# Patient Record
Sex: MALE | Race: BLACK OR AFRICAN AMERICAN
[De-identification: names, ages, dates, MRNs, and addresses within clinical notes are randomized per-mention and may not be internally consistent; named-entity substitution may affect disease eponyms.]

---

## 2019-08-15 ENCOUNTER — HOSPITAL ENCOUNTER (INPATIENT)
Dept: HOSPITAL 1 - ED | Age: 59
LOS: 7 days | Discharge: HOME | DRG: 41 | End: 2019-08-22
Attending: INTERNAL MEDICINE | Admitting: INTERNAL MEDICINE
Payer: COMMERCIAL

## 2019-08-15 VITALS
WEIGHT: 203 LBS | BODY MASS INDEX: 27.5 KG/M2 | HEIGHT: 72 IN | WEIGHT: 203 LBS | HEIGHT: 72 IN | BODY MASS INDEX: 27.5 KG/M2

## 2019-08-15 VITALS — DIASTOLIC BLOOD PRESSURE: 83 MMHG | SYSTOLIC BLOOD PRESSURE: 139 MMHG

## 2019-08-15 VITALS — DIASTOLIC BLOOD PRESSURE: 79 MMHG | SYSTOLIC BLOOD PRESSURE: 124 MMHG

## 2019-08-15 DIAGNOSIS — E11.42: Primary | ICD-10-CM

## 2019-08-15 DIAGNOSIS — Z79.84: ICD-10-CM

## 2019-08-15 DIAGNOSIS — Z22.322: ICD-10-CM

## 2019-08-15 DIAGNOSIS — M86.9: ICD-10-CM

## 2019-08-15 DIAGNOSIS — E11.69: ICD-10-CM

## 2019-08-15 DIAGNOSIS — I10: ICD-10-CM

## 2019-08-15 DIAGNOSIS — E11.621: ICD-10-CM

## 2019-08-15 DIAGNOSIS — Z89.422: ICD-10-CM

## 2019-08-15 DIAGNOSIS — M1A.9XX1: ICD-10-CM

## 2019-08-15 DIAGNOSIS — L97.511: ICD-10-CM

## 2019-08-15 DIAGNOSIS — M06.9: ICD-10-CM

## 2019-08-15 DIAGNOSIS — Z89.421: ICD-10-CM

## 2019-08-15 DIAGNOSIS — E83.42: ICD-10-CM

## 2019-08-15 DIAGNOSIS — E11.65: ICD-10-CM

## 2019-08-15 LAB
ALBUMIN SERPL-MCNC: 3.5 G/DL (ref 3.4–5)
ALP SERPL-CCNC: 99 U/L (ref 46–116)
ALT SERPL-CCNC: 37 U/L (ref 16–63)
AST SERPL-CCNC: 20 U/L (ref 15–37)
BASOPHILS NFR BLD: 0.4 % (ref 0–2)
BILIRUB SERPL-MCNC: 0.7 MG/DL (ref 0.2–1)
BUN SERPL-MCNC: 30 MG/DL (ref 7–18)
CALCIUM SERPL-MCNC: 9.5 MG/DL (ref 8.5–10.1)
CHLORIDE SERPL-SCNC: 100 MMOL/L (ref 98–107)
CHOLEST SERPL-MCNC: 127 MG/DL (ref ?–200)
CHOLEST/HDLC SERPL: 2.3 MG/DL
CO2 SERPL-SCNC: 18.3 MMOL/L (ref 21–32)
CREAT SERPL-MCNC: 1.7 MG/DL (ref 0.7–1.3)
ERYTHROCYTE [DISTWIDTH] IN BLOOD BY AUTOMATED COUNT: 14.5 % (ref 11.5–14.5)
GFR SERPLBLD BASED ON 1.73 SQ M-ARVRAT: 44 ML/MIN
GLUCOSE SERPL-MCNC: 170 MG/DL (ref 74–106)
HDLC SERPL-MCNC: 56 MG/DL (ref 40–60)
MICROSCOPIC UR-IMP: YES
PLATELET # BLD: 211 X10^3MCL (ref 130–400)
POTASSIUM SERPL-SCNC: 4.7 MMOL/L (ref 3.5–5.1)
PROT SERPL-MCNC: 8 G/DL (ref 6.4–8.2)
RBC # UR STRIP.AUTO: (no result) /UL
SODIUM SERPL-SCNC: 134 MMOL/L (ref 136–145)
TRIGL SERPL-MCNC: 147 MG/DL (ref ?–150)
UA SPECIFIC GRAVITY: 1.02 (ref 1–1.03)

## 2019-08-15 PROCEDURE — G0378 HOSPITAL OBSERVATION PER HR: HCPCS

## 2019-08-15 NOTE — NUR
PT MEDICATED PER MD ORDER, PT VERBALIZED UNDERSTANDING OF MEDICATION PRIOR TO
ADMINSITRATION. REPORTS THAT HIS WIFE CAN COME AT PICK HIM UP IF NEEDED. DR NUÑEZ AT BEDSIDE TO SPEAK WITH PT REGARDING PLAN OF CARE.

## 2019-08-15 NOTE — NUR
PT RESTING IN BED. NO ACUTE DISTRESS. AAOX4. BREATHING EVEN AND UNLABORED ON
RA. STATES BILAT 3RD TOE PAIN TOLERABLE AT THIS TIME. IVF INFUSING, NO REDNESS
OR SWELLING TO IV SITE. HOB ELEVATED. VISITOR AT BEDSIDE. BED IN LOW POSITON,
CALL LIGHT WITHIN REACH. WILL ENDORSE TO ONCOMING SHIFT.

## 2019-08-15 NOTE — NUR
RECEIVED PT IN BED, A/O X4. DENIES HEADACHE/DIZZINESS. RESP. EVEN AND
UNLABORED.ON ROOM AIR, LUNG SOUNDS CLEAR BILAT. NO ACUTE DISTRESS
NOTED.AFEBRILE AND VITAL SIGNS STABLE. DENIES CP OR ANY DISCOMFORT AT THIS
TIME. SWELLING TO RT, LT 3RD TOE ELEVATED ON PILLOW, WOUND CULTURE OBTAINED
AND SEND TO LAB AS ORDERED.IVF, NS AT 100ML/HR, INTACT AND INFUISNG VIA LT
HAND. SITE CLEAR. ASSISTED WITH HS CARE. CALL LIGHT WITHIN REACH. WILL
CONTINUE TO MONITOR.

## 2019-08-15 NOTE — NUR
PT SITTING COMFORTABLY IN GURNEY, AWAKE AND ALERT, RESP E/U, REPORTS PAIN IS
STILL 7/10 AT THIS TIME. MD MADE AWARE.

## 2019-08-15 NOTE — NUR
PT MEDICATED WITH SLOW IVP FENTANYL AND ABX INITIATED PER ORDER. PT VERBALIZED
OF MEDICATION PRIOR TO ADMINISTRATION.

## 2019-08-15 NOTE — NUR
PT BROUGHT IN BY SELF WITH C/O BILATERAL TOE PAIN. NO RECENT HX OF TRAUMA.
AT BEDSIDE PT IS AAOX4. RESPS E/U. SKIN IS PINK, WARM AND DRY. PERRLA.
PT PLACED ON MONITOR. BED RAILS UP X1 FOR SAFETY. PT ORIENTED TO ROOM, USE OF
CALL BELL AND BED IN LOWEST POSITION. PT IS CALM AND COOPERATIVE.
PT AMBULATED FROM LOBBY TO ED WITH STEADY GAIT. PT AWAITING MSE.

## 2019-08-15 NOTE — NUR
REPORT RECEIVE FROM [place]. PT SITTING ON SIDE OF GURNEY IN POSITION OF
COMFORT. AWAKE AND ALERT, RESP E/U, REPORTS TOE PAIN IS 7/10. MD MADE AWARE.

## 2019-08-16 VITALS — SYSTOLIC BLOOD PRESSURE: 113 MMHG | DIASTOLIC BLOOD PRESSURE: 68 MMHG

## 2019-08-16 VITALS — DIASTOLIC BLOOD PRESSURE: 78 MMHG | SYSTOLIC BLOOD PRESSURE: 126 MMHG

## 2019-08-16 VITALS — SYSTOLIC BLOOD PRESSURE: 123 MMHG | DIASTOLIC BLOOD PRESSURE: 74 MMHG

## 2019-08-16 VITALS — SYSTOLIC BLOOD PRESSURE: 103 MMHG | DIASTOLIC BLOOD PRESSURE: 61 MMHG

## 2019-08-16 LAB
BASOPHILS NFR BLD: 0.1 % (ref 0–2)
BUN SERPL-MCNC: 25 MG/DL (ref 7–18)
CALCIUM SERPL-MCNC: 8.5 MG/DL (ref 8.5–10.1)
CHLORIDE SERPL-SCNC: 104 MMOL/L (ref 98–107)
CO2 SERPL-SCNC: 18.3 MMOL/L (ref 21–32)
CREAT SERPL-MCNC: 1.7 MG/DL (ref 0.7–1.3)
ERYTHROCYTE [DISTWIDTH] IN BLOOD BY AUTOMATED COUNT: 14.7 % (ref 11.5–14.5)
GFR SERPLBLD BASED ON 1.73 SQ M-ARVRAT: 44 ML/MIN
GLUCOSE SERPL-MCNC: 113 MG/DL (ref 74–106)
MAGNESIUM SERPL-MCNC: 1.5 MG/DL (ref 1.8–2.4)
PLATELET # BLD: 187 X10^3MCL (ref 130–400)
POTASSIUM SERPL-SCNC: 4.7 MMOL/L (ref 3.5–5.1)
SODIUM SERPL-SCNC: 136 MMOL/L (ref 136–145)

## 2019-08-16 NOTE — NUR
INCISION AND DRAINAGE OF RT FOOT ABSCESS DONE AT THE BEDSIDE. AFEBRILE AND
VITAL SIGNS STABLE. KEPT COMFORTABLE. IVF AINTACT AND INFUSING WELL. VOIDING
FREELY. WILL ENDORSE TO INCOMING NURSE.

## 2019-08-16 NOTE — NUR
PT REPORTED ITCHING AFTER NIGHT RN GAVE NORCO FOR PAIN, REPORTED ITCHING
HAPPENED SEVERAL TIMES IN THE PAST WHEN NORCO GIVEN FOR PAIN, NO RESP DISTRESS
REPORTED, SKIN ASSESSMENT DONE, NO ZEENAT NOTED, NP REANNA MIRANDA MADE AWARE, NEW
ORDER NOTED, PT MADE AWARE, CHARGE NURSE DEVORAH MADE AWARE

## 2019-08-16 NOTE — NUR
PT RESTING IN BED, IN NO ACUTE RESP DISTRESS, AM MED GIVEN PER MD ORDER VIA
EMAR, TAKEN WELL, NO SIDE EFFECT NOTED AT THIS TIME, ALL NEEDS ADDRESSED
SAFETY PROTOCOL FOLLOWED, CHEKO TO MONITOR

## 2019-08-16 NOTE — NUR
PT IN BED AAO X4 VERBAL INTACT DRESSING TO RT 3RD TOE AMPUTATION, NO SIGNS OF
BLEEDING, SOME TOLERABLE PAIN, OFFERED PAIN MEDS PT REFUSED HE WILL CALL IF HE
NEEDED PAIN MEDS, REMINDED WT BEARING STATUS TO RT FOOT, IVF INFUSING WELL AS
ORDERED, SHIFT ASSESSMENT DONE, ATTENDED NEEDS CALL LIGHT AT REACH CONT TO
MONITOR.

## 2019-08-16 NOTE — NUR
NO COMPLAINTS NOTED AT THIS TIME. EYES CLOSED, APPEARS ASLEEP, EASILY
AROUSABLE . RESP. EVEN AND UNLABORED. NO ACUTE DISTRESS NOTED. CALL LIGHT
WITHIN REACH. WILL CONTINUE TO MONITOR.

## 2019-08-16 NOTE — NUR
PT RESTING IN BED, VERBAL, IN NO ACUTE DISTRESS, AXOX4, RESP EVEN, NO
COUGH/SOB, DENIED CP/PRESSURE/HA, DENIED N/V/D, ABD SOFT AND NON-TENDER,
AMBULAOTRY W/ ASSIST, DRESSING CDI, PALP PULSES, CAP REILF < 2 SECS,
CONTINENT, IV PATENT AND INFUSING WELL, ALL NEEDS ADDRESSED AT THIS TIME,
SAFETY PROTOCOL FOLLOWED, WILL ENDORSE TO ONCOMING RN

## 2019-08-16 NOTE — NUR
PT IN BED, IN NO ACUTE RESP DISTRESS, VERBAL, CALM AND COPPERATIVE, PERLLA, NO
REDNESS/DRAINAGE, RESP EVEN AND NON-LABORED, CHEST RISE SYMMETRICALLY,
MEDSURG, SEE SKIN ASSESSMENT, LUNGS CTA, RA, BS ACTIVE X 4, ABD SOFT AND
NON-TENDER TO TOUCH, CONTINENT, PALP PULSES, EDEMA TO (R) AND (L) 3RD TOES
POST-OP, DRESSING CDI, GEN. WEAKNESS, PT, IV PATENT AND INFUSIGN WELL, DENIED
PAIN/PRESSURE/CP, DENIED N/V/D, ALL NEEDS ADDRESSED AT THIS TIME, SAFETY
PROTOCOL FOLLOWED, CONTINUE TO MONITOR

## 2019-08-17 VITALS — SYSTOLIC BLOOD PRESSURE: 139 MMHG | DIASTOLIC BLOOD PRESSURE: 84 MMHG

## 2019-08-17 VITALS — DIASTOLIC BLOOD PRESSURE: 64 MMHG | SYSTOLIC BLOOD PRESSURE: 136 MMHG

## 2019-08-17 VITALS — DIASTOLIC BLOOD PRESSURE: 72 MMHG | SYSTOLIC BLOOD PRESSURE: 122 MMHG

## 2019-08-17 VITALS — SYSTOLIC BLOOD PRESSURE: 118 MMHG | DIASTOLIC BLOOD PRESSURE: 74 MMHG

## 2019-08-17 LAB
BASOPHILS NFR BLD: 0.6 % (ref 0–2)
BUN SERPL-MCNC: 25 MG/DL (ref 7–18)
CALCIUM SERPL-MCNC: 8.8 MG/DL (ref 8.5–10.1)
CHLORIDE SERPL-SCNC: 107 MMOL/L (ref 98–107)
CO2 SERPL-SCNC: 19.6 MMOL/L (ref 21–32)
CREAT SERPL-MCNC: 1.8 MG/DL (ref 0.7–1.3)
ERYTHROCYTE [DISTWIDTH] IN BLOOD BY AUTOMATED COUNT: 14.9 % (ref 11.5–14.5)
GFR SERPLBLD BASED ON 1.73 SQ M-ARVRAT: 41 ML/MIN
GLUCOSE SERPL-MCNC: 163 MG/DL (ref 74–106)
MAGNESIUM SERPL-MCNC: 2 MG/DL (ref 1.8–2.4)
PLATELET # BLD: 198 X10^3MCL (ref 130–400)
POTASSIUM SERPL-SCNC: 4.6 MMOL/L (ref 3.5–5.1)
SODIUM SERPL-SCNC: 138 MMOL/L (ref 136–145)

## 2019-08-17 NOTE — NUR
AAO TIMES 4. MED SURG PATIENT. LUNGS CTA. NO SOB. O2 SAT ON RA 96%. BS'S
ACTIVE TIMES 4. HAMILTON STRONG, EXCEPT HE IS AWARE THAT HE IS NON WEIGHT BEARING
ON RIGHT FOOT. RIGHT FOOT DRESSING CDI. COOPERATIVE. AND PLEASANT. PULSES
PALPABLE. NO C/O PAIN.

## 2019-08-17 NOTE — NUR
PT C/O LEG PAIN 6/10 PER ASSESSMENT, NO DISTRESS, INTACT DRESSING TO SURG
INCISION SITE, MEDICATED WITH TORADAL IVP AS ORDERED, REPOSITIONED SELF TO
COMFORT, BLE ELEVATED WITH PILLOWS CONT TO MONITOR.

## 2019-08-17 NOTE — NUR
AAO TIMES 4. MED SURG PATIENT. DRESSING TO RIGHT FOOT CDI. COOPERATIVE AND
PLEASANT. FAMILY VISITING WITH HIM MOST OF DAY, CARING AND CONCERNED. NO C/O
PAIN. NO SOB. HE KNOWS HE CAN AMBULATE AS LONG AS HE DOESNT BEAR HIS WEIGHT ON
HIS RIGHT FOREFOOT, HE STATES HE BEARS HIS WEIGHT ON HIS RIGHT HEEL.

## 2019-08-17 NOTE — NUR
RECEIVED PT SEATED UP IN BED AAO X4 VERBAL NO C/O PAIN RT LEG ELEVATED WITH
PILLOWS HAS INTACT DRY AND CLEAN DRESSING, PALPABLE PULSES, NO DISTRESS LUNGS
CTA, IVF INFUSING @ 100CC/HR IV ACCESS @ LH PATENT NON INFIL, CONT ON VANCO IV
AS ORDERED, NO ADV REACTION, SHIFT ASSESMENT DONE, CALL LIGHT AT REACH, WILL
CONT TO MONITOR.

## 2019-08-17 NOTE — NUR
DRESSING TO RIGHT FOOT CDI, PODIATRY RESIDENT  CAME IN AND CHANGED THE
DRESSING THIS AM. THE PATIENT STATED HE WANTS TO HAVE THE TOE AMPUTATED, AND
WANTS THE DR TO KNOW, I PAGED DR HUNTLEY 4 TIMES, BUT I GOT NO RETURN CALL
BACK. REANNA GARCIA AWARE OF THE ABOVE.

## 2019-08-17 NOTE — NUR
NEW IV ACCESS ESTABLISHED @ RT HAND WITH GOOD BLD RETURNED G#20, DC'D PREV
SITE DUE TO INFILTRATION, WITH SLIGHT SWELLING, DENIES PAIN, CONT TO MONITOR.

## 2019-08-17 NOTE — NUR
BLD SUGAR 153 MG/DL COVERED WITH 3UNITS REG. INSULIN PER SLIDING SCALE, NO
DISTRESS NO SIGNIFICANT CHANGES, WAS MEDICATED FOR PAIN X1, PAIN SCALE 6/10
INTACT DRESSING TO SURG INCISION SITE NO SIGNS OF BLEEDING, WILL ENDORSE TO
INCOMING SHIFT FOR F/U CARE.

## 2019-08-18 VITALS — DIASTOLIC BLOOD PRESSURE: 82 MMHG | SYSTOLIC BLOOD PRESSURE: 134 MMHG

## 2019-08-18 VITALS — SYSTOLIC BLOOD PRESSURE: 137 MMHG | DIASTOLIC BLOOD PRESSURE: 81 MMHG

## 2019-08-18 VITALS — DIASTOLIC BLOOD PRESSURE: 81 MMHG | SYSTOLIC BLOOD PRESSURE: 133 MMHG

## 2019-08-18 VITALS — DIASTOLIC BLOOD PRESSURE: 88 MMHG | SYSTOLIC BLOOD PRESSURE: 139 MMHG

## 2019-08-18 LAB
BASOPHILS NFR BLD: 0.2 % (ref 0–2)
BUN SERPL-MCNC: 19 MG/DL (ref 7–18)
CALCIUM SERPL-MCNC: 7.9 MG/DL (ref 8.5–10.1)
CHLORIDE SERPL-SCNC: 109 MMOL/L (ref 98–107)
CO2 SERPL-SCNC: 19.2 MMOL/L (ref 21–32)
CREAT SERPL-MCNC: 1.6 MG/DL (ref 0.7–1.3)
ERYTHROCYTE [DISTWIDTH] IN BLOOD BY AUTOMATED COUNT: 14.4 % (ref 11.5–14.5)
GFR SERPLBLD BASED ON 1.73 SQ M-ARVRAT: 47 ML/MIN
GLUCOSE SERPL-MCNC: 163 MG/DL (ref 74–106)
PLATELET # BLD: 178 X10^3MCL (ref 130–400)
POTASSIUM SERPL-SCNC: 4.5 MMOL/L (ref 3.5–5.1)
SODIUM SERPL-SCNC: 140 MMOL/L (ref 136–145)

## 2019-08-18 NOTE — NUR
PT C/O RT FOOT PAIN, NOTED WITH MINIMAL BLEEDING ON THE DRESSING @ RT FOOT,
ELEVATED AFFECTED SITE WITH A PILLOW AND INSTRUCTED PT TO IMMOBILIZED RT FOOT
KETORALAC IVP GIVEN PER PRN ORDER OF PAIN 7/10 PER ASSESSMENT, IVF INFUSING
WELL, CONT ON VANCO IV, NO ADV REACTION, WELL ENDORSE TO INCOMING SHIFT FOR
F/U CARE.

## 2019-08-18 NOTE — NUR
PATIENT SITTING UP ON SIDE OF BED EATING DINNER AT THIS TIME. PATIENT
TOLERATING DIET WELL. NO APPARENT DISTRESS OR DISCOMFORT NOTED. ALL NEEDS
ATTENDED TO. WILL CONTINUE TO MONITOR

## 2019-08-18 NOTE — NUR
PATIENT MARKED RIGHT FOOT WITH BLUE MARKER TO INDICATE SURGICAL SITE FOR
PROCEDURE TOMORROW. ALL NEEDS ATTENDED TO. WILL CONTINUE TO MONITOR

## 2019-08-18 NOTE — NUR
RECEIVED BEDSIDE REPORT FROM NIGHT SHIFT NURSE AT THIS TIME. PATIENT RESTING
COMFORTABLY IN BED. NO APPARENT DISTRESS OR DISCOMFORT NOTED. FAMILY MEMBER AT
BEDSIDE. BREATHING EVEN AND UNLABORED. NO RESPIRATORY DISTRESS OR DISCOMFORT
NOTED. PATIENT DENIES SHORTNESS OF BREATH. PATIENT DENIES CHEST PAIN/PRESSURE
AT THIS TIME. DRESSING NOTED TO RIGHT 3RD TOE WITH MINIMAL BLOOD. PATIENT C/O
MILD DISCOMFORT TO RIGHT FOOT. IV PATENT AND INTACT. ALL QUESTIONS AND
CONCERNS ADDRESSED. ALL NEEDS ATTENDED TO. WILL CONTINUE TO MONITOR

## 2019-08-18 NOTE — NUR
PATIENT BLOOD SUGAR 205. 6 UNITS OF INSULIN COVERAGE REQUIRED. ALL NEEDS
ATTENDED TO. WILL CONTINUE TO MONITOR

## 2019-08-18 NOTE — NUR
PATIENT BLOOD SUGAR 185. 3 UNITS INSULIN COVERAGE REQUIRED. ALL NEEDS ATTENDED
TO. WILL CONTINUE TO MONITOR

## 2019-08-18 NOTE — NUR
MORNING MEDICATIONS ADMINISTERED. PATIENT TOLERATED MEDICATIONS WELL. NO
ADVERSE EFFECTS NOTED. ALL NEEDS ATTENDED TO. WILL CONTINUE TO MONITOR

## 2019-08-18 NOTE — NUR
RECEIVED AWAKE IN BED, ALERT AND ORIENTED X4, WIFE AT BEDSIDE VERY SUPPORTIVE
OF PATIENT'S CURRENT PLAN OF CARE. IV ACCESS AT THE RIGHT HAND INTACT AND
PATENT, WITH IVF NS INFUSING AT 100CC/HR TOLERATING WELL. DRESSING TO RIGHT
FOOT INTACT, DENIES ANY PAIN/DISCOMFORT AT THIS TIME.

## 2019-08-18 NOTE — NUR
PATIENT SITTING UP IN BED EATING LUNCH AT THIS TIME. PATIENT TOLERTATING DIET
WELL. NO APPARENT DISTRESS OR DISCOMFORT NOTED. ALL NEEDS ATTENDED TO. WILL
CONTINUE TO MONITOR

## 2019-08-18 NOTE — NUR
DRESSING CHANGED AT THIS TIME. CLEANSED WITH BETADINE, COVERED WITH 4X4,
AND WRAPPED WITH ACE WRAP. PATIENT TOLERATED DRESSING CHANGE WELL. NO APPARENT
DISTRESS OR DISCOMFORT NOTED. ALL NEEDS ATTENDED TO. WILL CONTINUE TO MONITOR

## 2019-08-18 NOTE — NUR
BLOOD SUGAR CHECK AS 175MG/DL, REFUSED INSULIN COVERAGE PER PROTOCOL, PT
CLAIMED NO APPETITE TO TAKE HS SNACK. EXPLAINED THE RISKS/BENEFITS BUT NO
AVAIL.

## 2019-08-18 NOTE — NUR
PATIENT RESTING COMFORTABLY IN BED AT THIS TIME. FAMILY MEMBER AT BEDSIDE. NO
APPARENT DISTRESS OR DISCOMFORT NOTED. IV PATENT AND INTACT. ALL QUESTIONS AND
CONCERNS ADDRESSED. ALL NEEDS ATTENDED TO. SAFETY PRECAUTIONS MAINTAINED.
ENDORSED ALL CARE TO NIGHT SHIFT NURSE

## 2019-08-19 VITALS — DIASTOLIC BLOOD PRESSURE: 88 MMHG | SYSTOLIC BLOOD PRESSURE: 159 MMHG

## 2019-08-19 VITALS — DIASTOLIC BLOOD PRESSURE: 85 MMHG | SYSTOLIC BLOOD PRESSURE: 152 MMHG

## 2019-08-19 VITALS — SYSTOLIC BLOOD PRESSURE: 148 MMHG | DIASTOLIC BLOOD PRESSURE: 85 MMHG

## 2019-08-19 VITALS — DIASTOLIC BLOOD PRESSURE: 79 MMHG | SYSTOLIC BLOOD PRESSURE: 145 MMHG

## 2019-08-19 LAB
ALBUMIN SERPL-MCNC: 3 G/DL (ref 3.4–5)
ALP SERPL-CCNC: 100 U/L (ref 46–116)
ALT SERPL-CCNC: 18 U/L (ref 16–63)
AST SERPL-CCNC: 15 U/L (ref 15–37)
BASOPHILS NFR BLD: 1.2 % (ref 0–2)
BILIRUB SERPL-MCNC: 0.33 MG/DL (ref 0.2–1)
BUN SERPL-MCNC: 16 MG/DL (ref 7–18)
CALCIUM SERPL-MCNC: 8.2 MG/DL (ref 8.5–10.1)
CHLORIDE SERPL-SCNC: 110 MMOL/L (ref 98–107)
CO2 SERPL-SCNC: 20.3 MMOL/L (ref 21–32)
CREAT SERPL-MCNC: 1.5 MG/DL (ref 0.7–1.3)
ERYTHROCYTE [DISTWIDTH] IN BLOOD BY AUTOMATED COUNT: 14.1 % (ref 11.5–14.5)
GFR SERPLBLD BASED ON 1.73 SQ M-ARVRAT: 51 ML/MIN
GLUCOSE SERPL-MCNC: 139 MG/DL (ref 74–106)
PLATELET # BLD: 198 X10^3MCL (ref 130–400)
POTASSIUM SERPL-SCNC: 4.6 MMOL/L (ref 3.5–5.1)
PROT SERPL-MCNC: 7.6 G/DL (ref 6.4–8.2)
SODIUM SERPL-SCNC: 141 MMOL/L (ref 136–145)

## 2019-08-19 PROCEDURE — 0Y6T0Z0 DETACHMENT AT RIGHT 3RD TOE, COMPLETE, OPEN APPROACH: ICD-10-PCS | Performed by: PODIATRIST

## 2019-08-19 NOTE — NUR
MAINTAINED ON NPO AFTER MIDNIGHT FOR AMPUTATION OF THE 3RD TO IN AM. PATIENT
COOPERATIVE WITH PLAN OF CARE. CONTINUES ON ATB IVPB WITHOUT ADVERSE REACTION
NOTED.

## 2019-08-19 NOTE — NUR
RECEIVED PT IN BED, RESTING QUIETLY. A/O X4.DENIES HEADACHE/DIZZINESS.
RESP.EVEN AND UNLABORED. ON ROOM AIR, NO ACUTE DISTRESS NOTED. MED-SURG PT,
DENIES CP OR PRESSURE. DRESSING TO RLE DRY AND INTACT, FOOT ELEVATED ON
PILLOW. ABLE TO MOVE EXTS. IVF , NS AT 100ML/HR, INTACT AND INFUSING VIA RH ,
SITE CLEAR. ASSISTED WITH HS CARE. NO COMPLAINTS NOTED AT THIS TIME. CALL
LIGHT WITHIN REACH. WILL CONTINUE TO MONITOR.

## 2019-08-19 NOTE — NUR
C/O SEVERE PAIN AT THE RIGHT 3RD DIGIT , MORPHINE 2MG IVP AS PRN MEDICATION
WITH GOOD RELIEF. BLOOD SUGAR CHECK AS 120MG/DL, NO S/S OF GLYCEMIC REACTION.
ALL NEEDS ATTENDED.

## 2019-08-19 NOTE — NUR
PT BACK FROM O.R AWAKE AND ALERT, IN NO ACUTE RESP. DISTRESS. C/O PAIN TO RT
FOOT 10/10. MEDICATED WITH MORPHINE IVP.FAMILY AT BEDSIDE. INCISION SITE TO RT
TOE WITH DRESSING INTACT. SECURED WITH ACE WRAP. NO BLEEDING NOTED. PT ABLE TO
WIGGLE AND MOVE TOES.

## 2019-08-19 NOTE — NUR
REMAINS IN NO DISTRESS, AWAKE AND ALERT. FAMILY AT BEDSIDE. NO C/O PAIN OR
DISCOMFORT AT THIS TIME. IVF INFUSING WELL AND SITE CLEAR. DRESSING TO RT FOOT
D/C/I.PT ENCOURAGED TO KEEP IT ELEVETED WITH PILLOW. CALL LIGHT WITHIN
REACH.WILL BE ENDORSED TO INCOMING SHIFT.

## 2019-08-20 VITALS — DIASTOLIC BLOOD PRESSURE: 77 MMHG | SYSTOLIC BLOOD PRESSURE: 142 MMHG

## 2019-08-20 VITALS — SYSTOLIC BLOOD PRESSURE: 123 MMHG | DIASTOLIC BLOOD PRESSURE: 69 MMHG

## 2019-08-20 VITALS — DIASTOLIC BLOOD PRESSURE: 80 MMHG | SYSTOLIC BLOOD PRESSURE: 142 MMHG

## 2019-08-20 VITALS — DIASTOLIC BLOOD PRESSURE: 96 MMHG | SYSTOLIC BLOOD PRESSURE: 161 MMHG

## 2019-08-20 LAB
BASOPHILS NFR BLD: 0.3 % (ref 0–2)
BUN SERPL-MCNC: 13 MG/DL (ref 7–18)
CALCIUM SERPL-MCNC: 8.2 MG/DL (ref 8.5–10.1)
CHLORIDE SERPL-SCNC: 104 MMOL/L (ref 98–107)
CO2 SERPL-SCNC: 19.6 MMOL/L (ref 21–32)
CREAT SERPL-MCNC: 1.5 MG/DL (ref 0.7–1.3)
ERYTHROCYTE [DISTWIDTH] IN BLOOD BY AUTOMATED COUNT: 13.8 % (ref 11.5–14.5)
GFR SERPLBLD BASED ON 1.73 SQ M-ARVRAT: 51 ML/MIN
GLUCOSE SERPL-MCNC: 160 MG/DL (ref 74–106)
PLATELET # BLD: 189 X10^3MCL (ref 130–400)
POTASSIUM SERPL-SCNC: 4.2 MMOL/L (ref 3.5–5.1)
SODIUM SERPL-SCNC: 137 MMOL/L (ref 136–145)

## 2019-08-20 NOTE — NUR
TEMP. 100.4, COOLING MEASURES APPLIED. TYLENOL 650MG PO GIVEN. PT ALSO
COMPLAINED OF RT FOOT PAIN, 6/10, MEDICATED WITH MORPHINE SULFATE AS ORDERED.
DUE MEDS GIVEN AS ORDERED. JANIS. WELL. RT FOOT DRESSING DRY AND INTACT. FOOT
REMAINS ELEVATED ON A PILLOW. IVF INTACT AND INFUSING WELL, SITE CLEAR.KEPT
COMFORTABLE. CALL LIGHT WITHIN REACH. WILL CONTINUE TO MONITOR.

## 2019-08-20 NOTE — NUR
PT SITTING UPRIGHT IN BED HAVING LUNCH, AOX4, RESP E/I ON RA. REPORTED PAIN
TO R FOOT RATED 8/10, DRESING CDI. MEDICATED AS ORDERED PER EMAR, COMFOR
TMEASURES IMPLEMTENTED. BED IN LOWEST POSITION AND CALL LIGHT WITHIN REACH.
WILL CONTINUNE TO MONITOR.

## 2019-08-20 NOTE — NUR
PT RESTING IN BED, AOX4, RESP E/U ON RA. REPORTED MILD PAIN TO R FOOT BUT
TOLERABLE, NO REQUEST FOR PAIN MEDS AT THIS TIME, COMFORT MEASURES IMPLEMNTED.
IV TO R HAND W/ NO SIGNS OF INFILTRATION, IVF INFUSING WELL. BED IN LOWEST
POSITION AND CALL LIGHT WITHIN REACH. WILL ENDORSE TO ONCOMING NURSE.

## 2019-08-20 NOTE — NUR
EYES CLOSED, APPEARS ASLEEP. EASILY AROUSABLE. RESP. EVEN AND UNLABORED. NO
ACUTE DISTRESS NOTED. IVF INTACT AND INFUSING WELL, SITE CLEAR. CALL LIGHT
WITHIN REACH. WILL CONTINUE TO MONITOR.

## 2019-08-20 NOTE — NUR
COMPLAINED OF RT FOOT AND HAND PAIN, 6/10, REQUESTING PAIN MED, MEDICATED WITH
MORPHINE SULFATE AS ORDERED. WILL CONTINUE TO MONITOR.

## 2019-08-20 NOTE — NUR
RECEIVED PT IN BED. A/O X4. DENIES HEADACHE/DIZZINESS. RESP. EVEN AND
UNLABORED. LUNG SOUNDS CLEAR BILAT. ON ROOM AIR, NO ACUTE DISTRESS
NOTED.DRESSING TO RLE , DRY AND INTACT. FOOT ELEVATED ON PILLOW, ABLE TO MOVE
EXTS. IVF, NS AT 100ML/HR, INTACT AND INFUSING VIA RT HAND, SITE CLEAR.
VOIDING FREELY. CALL LIGHT WITHIN REACH. WILL CONTINUE TO MONITOR.

## 2019-08-21 VITALS — DIASTOLIC BLOOD PRESSURE: 83 MMHG | SYSTOLIC BLOOD PRESSURE: 149 MMHG

## 2019-08-21 VITALS — SYSTOLIC BLOOD PRESSURE: 124 MMHG | DIASTOLIC BLOOD PRESSURE: 81 MMHG

## 2019-08-21 VITALS — SYSTOLIC BLOOD PRESSURE: 122 MMHG | DIASTOLIC BLOOD PRESSURE: 890 MMHG

## 2019-08-21 VITALS — DIASTOLIC BLOOD PRESSURE: 91 MMHG | SYSTOLIC BLOOD PRESSURE: 144 MMHG

## 2019-08-21 LAB
BASOPHILS NFR BLD: 0.1 % (ref 0–2)
BUN SERPL-MCNC: 17 MG/DL (ref 7–18)
CALCIUM SERPL-MCNC: 8.5 MG/DL (ref 8.5–10.1)
CHLORIDE SERPL-SCNC: 101 MMOL/L (ref 98–107)
CO2 SERPL-SCNC: 17.5 MMOL/L (ref 21–32)
CREAT SERPL-MCNC: 1.7 MG/DL (ref 0.7–1.3)
ERYTHROCYTE [DISTWIDTH] IN BLOOD BY AUTOMATED COUNT: 13.8 % (ref 11.5–14.5)
GFR SERPLBLD BASED ON 1.73 SQ M-ARVRAT: 44 ML/MIN
GLUCOSE SERPL-MCNC: 146 MG/DL (ref 74–106)
PLATELET # BLD: 204 X10^3MCL (ref 130–400)
POTASSIUM SERPL-SCNC: 4.2 MMOL/L (ref 3.5–5.1)
SODIUM SERPL-SCNC: 134 MMOL/L (ref 136–145)

## 2019-08-21 NOTE — NUR
THE PATIENT AMBULATED FROM BATHROOM BACK TO THE BED AND SAT AT THE BEDSIDE.
PATIENT HAD SLOW BUT STEADY GAIT.
PATIENT ORIENTED TO PERSON, PLACE AND TIME.
PATIENT DENIES SHORTNESS OF BREATH, NAUSEA/VOMITING OR PAIN AT THIS TIME.
IVF NS AT 100CC/HR VIA IV SITE TO LFA.
CALL LIGHT WITHIN REACH.
SIDE RAILS UP X2.
BED IS AT LOWEST POSITION.

## 2019-08-21 NOTE — NUR
RECIEVED PATIENT AT START OF SHIFT A/O X4. MED-SURG. NO SOB ON RA, LUNGS CTAB.
REPORTING 6/10 PAIN TO RLE. WILL MEDICATE WHEN PAIN MED IS DUE. +1 PITTING
EDEMA NOTED TO RLE. RIGHT FOOT DRESSING IN PLACE, CDI. IV TO LFA INFUSING
WITHOUT ERYTHEMA OR INFILTRATION. BED LOCKED AND IN LWOEST POSIITON. POST OP
SHOE FOR RIGHT FOOT AT BEDSIDE. CALL LIGHT AND BEDSIDE TABLE WITHIN REACH.

## 2019-08-21 NOTE — NUR
SLEEPING QUIETLY. APPEARS COMFORTABLE. EASILY AROUSABLE. RESP. EVEN AND
UNLABORED. NO ACUTE DISTRESS NOTED. WILL CONTINUE TO MONITOR.

## 2019-08-21 NOTE — NUR
1. Recommend continuing [facility] diet.
2. Diabetes diet education has been provided.

## 2019-08-21 NOTE — NUR
Initial Nutrition Assessment: 252/B AYAD MAHMOOD MR IA
 
Dx: R 3rd toe osteomyelitis
PMHx: HTN, DM, RA
PSHx: BILATERAL PINKY TO AMPUTATIONS
Labs: NA 134L, BG 146H, CREAT 1.7H, A1C 7.6H
Meds: Colace, D 50%, ferrous sulfate, humulin, Lipitor, vancomycin, Pepcid,
zofran
Diet: [facility]
PO Intake: () breakfast 100%, () dinner 100%, breakfast, lunch 90%
Ht: 182.88 cm (72")   Wt: 92 kg (202#)  BMI: 27.5 kg/m2   Bed scale: 92 kg
IBW: 178# (81 kg) %IBW: 113  UBW: 90-95 kg
Age: 59/M
Food Allergies: NKFA
Skin: s/p R 3rd toe amputation, dressing intact   Arnold: 19
Edema: trace RLE
GI: Last BM: 
 
Per H&P, Pt is a 59-year-old Male who presents to the ED for evaluation of a 2
month history of constant pain and swelling to the right 3rd toe.
 
RDN Visit (): Patient was alert and oriented and said that he ate most of
his breakfast this morning. Per progress note () Diabetic foot ulcer s/p
3rd toe amputation 19
MRSA right 4th digit toe. Plan is to continue ivabx, pain mgmt.
 
Problem with:  N/V/D/C: none
Problems with: Chewing/Swallowing:  none
Current appetite: good
Recent wt change: gained few #, not sure exactly  %wt change: n/a
Vitamin/Supplement use: none
Special diet at home: Diabetic
Physical activity: walking
Nutrition education given: Diabetes diet education was provided using [facility]
handout on 'Type 2 Diabetes Nutrition Therapy'. Concepts like high fiber diet,
label reading, types of carbohydrates and portion control were discussed.
Patient verbalized understanding and did not have any questions at this time.
Food-drug interactions: Colace- high fiber w/2589-5643 ml fluids Education
given: n/a
 
Estimated Nutritional Needs Based on ideal body weight 81 kg
 Energy: 1199-7045 kcal/d (25-30 kcal/kg)
 Protein: 81-97 g/d (1.0-1.2g/kg)- foot ulcer
 Fluid: 8978-5878 ml/d (1 ml/kcal) or per doctor
 
Nutrition Diagnosis
1. 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
evidenced by A1C 7.6, B
 
Intervention
1. Recommend continuing [facility] diet.
2. Diabetes diet education has been provided.
 
 
Monitor/Evaluate
 Goal: PO intake at least 75% of estimated needs
 Monitor: PO intake, Labs, GI function
 F/U in 7 days as low risk

## 2019-08-21 NOTE — NUR
COMPLAINING OF PAIN TO IV SITE, IV DISCONT. NEW IV SITE RESTARTED ON LFA WITH
#22G ANGIO. IVF RECONNECTED AND INFUSING WELL AT THIS TIME. WILL CONTINUE TO
MONITOR./

## 2019-08-21 NOTE — NUR
COMPLAINING OF RT HAND PAIN, 4/10, MEDICATED WITH TORADOL AS ORDERED. TEMP.
100.9, TYLENOL PO GIVEN AS ORDERED. DUE MEDS GIVEN AS ORDERED, JANIS. WELL. IVF
INTACT AND INFUSING WELL, SITE CLEAR. RT FOOT DRESSING DRY AND INTACT.ELEVATED
ON PILLOW. KEPT COMFORTABLE. VOIDING FREELY. NO BM NOTED. CONTACT ISOLATION
PREC. MAINTAINED. WILL CONTINUE TO MONITOR.

## 2019-08-22 VITALS — SYSTOLIC BLOOD PRESSURE: 126 MMHG | DIASTOLIC BLOOD PRESSURE: 77 MMHG

## 2019-08-22 VITALS — DIASTOLIC BLOOD PRESSURE: 92 MMHG | SYSTOLIC BLOOD PRESSURE: 149 MMHG

## 2019-08-22 VITALS — SYSTOLIC BLOOD PRESSURE: 136 MMHG | DIASTOLIC BLOOD PRESSURE: 77 MMHG

## 2019-08-22 VITALS — SYSTOLIC BLOOD PRESSURE: 152 MMHG | DIASTOLIC BLOOD PRESSURE: 85 MMHG

## 2019-08-22 NOTE — NUR
R HAND ULTRASOUND RESULT AVAILABLE - NEGATIVE FOR DVT. PATIENT MAY DC HOME.
PRINTED DISCHARGE INSTRUCTIONS GIVEN AND EXPLAINED TO PATIENT AND WIFE.
PRESCRIPTION PROVIDED. IV CATHETER REMOVED INTACT. PATIENT HAS  HOME WALKER.
GOING HOME WITH POST-OP SHOE +1

## 2019-08-22 NOTE — NUR
AT 0900 - C/O PAIN OF R HAND -OLD IV SITE. NOTED EDEMA BUT NO REDDNESS. HAS
HAD ORDER OF ULTRASOUND BY NP. PATIENT REPORTS THAT PAIN IS NOT CONTROLLED
WITH TORADOL. GIVEN IV MORPHINE AT THIS TIME. WILL GET ORDER FOR PO PAIN MEDS.
AT 1000 - REPROTS GOOD RELIEF OF PAIN.
AT 1055 - PHYSICAL THERAPY AT BEDSIDE.

## 2019-08-22 NOTE — NUR
AT 0720 - RECEIVED PATIENT FROM NIGHT NURSE. AWAKE, ALERT AND ORIENTED X 4. .
IV INFUSING NS AT 100ML/HR. PATIENT C/O PAIN IN R HAND AT SITE OF PREVIOUS IV.
NOTED SOME EDEMA OF HAND. NO REDDNESS NOTED. ENCOURAGED PATIENT TO KEEP
ELEVATED ON PILLOW. DRESSING TO R FOOT IS DRY AND INTACT. PATIENT ALLOWED TO
WEIGHT BEAR ON HEEL ONLY. POST-OP SHOE AT BEDSIDE.

## 2019-08-22 NOTE — NUR
AWAITING ULTRASOUND RESULTS. IF NEGATIVE PATIENT TO DC HOME WITH HOME
HELATH. PATIENT REPROTS THAT HE ALREADY AHS A WALKER AT HOME.

## 2019-08-22 NOTE — NUR
PATIENT IS AWAKE WATCHING TV. DENEIS PAIN. NO DISTRESS OR SOB NOTED. IV
INFUSING WELL. BED LOCKED AND IN LOWEST POSIITON, CALL LIGHT AND BEDSDIE TABLE
WITHIN REACH.

## 2019-08-22 NOTE — NUR
PATIENT SLEPT INTERMITTENTLY THROUGH THE NIGHT. NO SOB OR COMPLAINT OF PAIN.
IV INFUSING WELL. RIGHT FOOT DRESSING CDI.  POST OP SHOE AT BEDSDIE. CALL
LIGHT AND BEDSIDE TABLE WITHIN REACH. WILL ENDORSE CARE TO DAYSHIFT NURSE.

## 2019-08-26 ENCOUNTER — HOSPITAL ENCOUNTER (EMERGENCY)
Dept: HOSPITAL 1 - ED | Age: 59
Discharge: HOME | End: 2019-08-26
Payer: COMMERCIAL

## 2019-08-26 VITALS — BODY MASS INDEX: 27.09 KG/M2 | WEIGHT: 200 LBS | HEIGHT: 72 IN

## 2019-08-26 VITALS — SYSTOLIC BLOOD PRESSURE: 135 MMHG | DIASTOLIC BLOOD PRESSURE: 71 MMHG

## 2019-08-26 DIAGNOSIS — Z48.01: ICD-10-CM

## 2019-08-26 DIAGNOSIS — I10: ICD-10-CM

## 2019-08-26 DIAGNOSIS — Z88.6: ICD-10-CM

## 2019-08-26 DIAGNOSIS — E11.9: ICD-10-CM

## 2019-08-26 DIAGNOSIS — Z98.890: ICD-10-CM

## 2019-08-26 DIAGNOSIS — Z88.5: ICD-10-CM

## 2019-08-26 DIAGNOSIS — M06.9: ICD-10-CM

## 2019-08-26 DIAGNOSIS — M19.90: Primary | ICD-10-CM

## 2020-09-26 ENCOUNTER — HOSPITAL ENCOUNTER (EMERGENCY)
Dept: HOSPITAL 1 - ED | Age: 60
Discharge: HOME | End: 2020-09-26
Payer: COMMERCIAL

## 2020-09-26 VITALS
HEIGHT: 72 IN | BODY MASS INDEX: 27.36 KG/M2 | WEIGHT: 202 LBS | WEIGHT: 202 LBS | BODY MASS INDEX: 27.36 KG/M2 | HEIGHT: 72 IN

## 2020-09-26 VITALS — SYSTOLIC BLOOD PRESSURE: 170 MMHG | DIASTOLIC BLOOD PRESSURE: 101 MMHG

## 2020-09-26 DIAGNOSIS — M06.9: ICD-10-CM

## 2020-09-26 DIAGNOSIS — E11.9: ICD-10-CM

## 2020-09-26 DIAGNOSIS — Z88.5: ICD-10-CM

## 2020-09-26 DIAGNOSIS — M86.9: Primary | ICD-10-CM

## 2020-09-26 DIAGNOSIS — I10: ICD-10-CM

## 2020-09-26 LAB
BASOPHILS NFR BLD: 1.3 % (ref 0–2)
ERYTHROCYTE [DISTWIDTH] IN BLOOD BY AUTOMATED COUNT: 15.1 % (ref 11.5–14.5)
PLATELET # BLD: 200 X10^3MCL (ref 130–400)

## 2024-06-16 NOTE — NUR
RECEIVED PT FROM NIGHT SHIFT NURSE. PT IN BED SLEEPING, AROUSABLE, RESP E/U ON
RA. NO SIGNS OF ACUTE DISTRESS NOTED. IV TO RH W/ NO SIGNS OF INFILTRATION,
IVF INFUSING WELL. DRESSING TO RLE CDI, CONTACT PRECAUTIONS MAINTAINED FOR
MRSA TO WOUND S/P R TOE AMPUTATION. BED IN LOWEST POSITION AND CALL LIGHT
WITHIN REACH. WILL CONTINUE TO MONITOR. 3 (mild pain)

## 2024-11-30 NOTE — NUR
RECEIVED PT FROM ED VIA WHEELCHAIR, CAME IN DUE TO SWELLING AND PAIN ON
BILATERAL 3RD TOES. AAOX4. DENIES HEADACHE/DIZZINESS. NO SOB NOTED, LUNG
SOUNDS CTA. DENIES CHEST PAIN/PRESSURE, AL=95. DENIES ABDOMINAL DISCOMFORT.
C/O NUMBNESS ON BILATERAL FEET AND 7/10 THROBBING AND INTERMITTENT PAIN ON THE
BILATERAL 3RD TOES. SKIN IS DRY AND INTACT. SIDE RAILS UPX2. CALL LIGHT ON
REACH. PRIMARY NURSE CAROL AT BEDSIDE FOR CONTINUITY OF CARE. WIFE AT BEDSIDE yes

## 2025-07-31 NOTE — NUR
PT RESTING QUIETLY , WATCHING T.V. DENIES PAIN OR ANY DISCOMFORT AT THIS TIME.
PAIN LEVEL AT 0/10. WILL CONTINUE TO MONITOR. Bed/Stretcher in lowest position, wheels locked, appropriate side rails in place/Call bell, personal items and telephone in reach/Instruct patient to call for assistance before getting out of bed/chair/stretcher/Non-slip footwear applied when patient is off stretcher/[place] to call system/Physically safe environment - no spills, clutter or unnecessary equipment/Purposeful proactive rounding/Room/bathroom lighting operational, light cord in reach